# Patient Record
Sex: MALE | Employment: UNEMPLOYED | URBAN - METROPOLITAN AREA
[De-identification: names, ages, dates, MRNs, and addresses within clinical notes are randomized per-mention and may not be internally consistent; named-entity substitution may affect disease eponyms.]

---

## 2017-05-05 ENCOUNTER — NON-PROVIDER VISIT (OUTPATIENT)
Dept: NEUROLOGY | Facility: MEDICAL CENTER | Age: 27
End: 2017-05-05
Payer: COMMERCIAL

## 2017-05-05 DIAGNOSIS — M79.2 PERIPHERAL NEUROGENIC PAIN: ICD-10-CM

## 2017-05-05 PROCEDURE — 95909 NRV CNDJ TST 5-6 STUDIES: CPT

## 2017-05-05 PROCEDURE — 95886 MUSC TEST DONE W/N TEST COMP: CPT

## 2017-05-05 NOTE — PROCEDURES
DATE OF SERVICE:  05/05/2017    REQUESTING PHYSICIAN:  KEVIN Caldera    EMG nerve conduction studies were requested in the right upper extremity   because of chronic pain following an infection in the hand.    The full report was scanned into the Epic system.    The motor nerve conduction studies, median, ulnar and radial all were normal.    Sensory conduction studies, median, ulnar and radial all were normal.    Needle exam of the right upper extremity included biceps, triceps, deltoid,   extensor digitorum communis, abductor pollicis brevis and abductor digiti   quinti.  All of these muscles were normal.    IMPRESSION:  This is a normal EMG and nerve conduction study of the right   upper extremity.    In light of the chronic pain and the absence of demonstrable nerve injury,   chronic regional pain syndrome might be considered.       ____________________________________     MD ONESIMO DEL CASTILLO / KALYN    DD:  05/05/2017 09:47:13  DT:  05/05/2017 10:19:14    D#:  [id number]  Job#:  [id number]    cc: Rai BROWN

## 2017-05-05 NOTE — PROCEDURES
DATE OF SERVICE:    REQUESTING PHYSICIAN:  KEVIN Caldera    EMG nerve conduction studies were requested in the right upper extremity   because of chronic pain following an infection in the hand.    The full report was scanned into the Epic system.    The motor nerve conduction studies, median, ulnar and radial all were normal.    Sensory conduction studies, median, ulnar and radial all were normal.    Needle exam of the right upper extremity included biceps, triceps, deltoid,   extensor digitorum communis, abductor pollicis brevis and abductor digiti   quinti.  All of these muscles were normal.    IMPRESSION:  This is a normal EMG and nerve conduction study of the right   upper extremity.    In light of the chronic pain and the absence of demonstrable nerve injury,   chronic regional pain syndrome might be considered.       ____________________________________     MD ONESIMO DEL CASTILLO / KALYN    DD:  05/05/2017 09:47:13  DT:  05/05/2017 10:19:14    D#:  [id number]  Job#:  [id number]    cc: Rai BROWN

## 2017-05-05 NOTE — MR AVS SNAPSHOT
Kody Schreiber   2017 9:30 AM   Non-Provider Visit   MRN: [id number]    Department:  [facility]   Dept Phone:  [phone]    Description:  Male : 1990   Provider:  [facility]           Reason for Visit     Procedure           Allergies as of 2017     Not on File      You were diagnosed with     Peripheral neurogenic pain (CMS-HCC)   [5270551]         Basic Information     Date Of Birth Sex Race Ethnicity Preferred Language    1990 Male Unable to Obtain Unknown English      Health Maintenance     Patient has no pending health maintenance at this time      Results       Current Immunizations     No immunizations on file.      Below and/or attached are the medications your provider expects you to take. Review all of your home medications and newly ordered medications with your provider and/or pharmacist. Follow medication instructions as directed by your provider and/or pharmacist. Please keep your medication list with you and share with your provider. Update the information when medications are discontinued, doses are changed, or new medications (including over-the-counter products) are added; and carry medication information at all times in the event of emergency situations     Allergies:  (Not on file)          Medications  Valid as of: May 05, 2017 - 12:21 PM    Generic Name Brand Name Tablet Size Instructions for use    .                 Medicines prescribed today were sent to:     None      Medication refill instructions:       If your prescription bottle indicates you have medication refills left, it is not necessary to call your provider’s office. Please contact your pharmacy and they will refill your medication.    If your prescription bottle indicates you do not have any refills left, you may request refills at any time through one of the following ways: The online [employer] system (except Urgent Care), by calling your provider’s office, or by asking your  pharmacy to contact your provider’s office with a refill request. Medication refills are processed only during regular business hours and may not be available until the next business day. Your provider may request additional information or to have a follow-up visit with you prior to refilling your medication.   *Please Note: Medication refills are assigned a new Rx number when refilled electronically. Your pharmacy may indicate that no refills were authorized even though a new prescription for the same medication is available at the pharmacy. Please request the medicine by name with the pharmacy before contacting your provider for a refill.           MyChart Status: Patient Declined